# Patient Record
Sex: FEMALE | Race: ASIAN
[De-identification: names, ages, dates, MRNs, and addresses within clinical notes are randomized per-mention and may not be internally consistent; named-entity substitution may affect disease eponyms.]

---

## 2018-03-19 ENCOUNTER — HOSPITAL ENCOUNTER (OUTPATIENT)
Dept: HOSPITAL 7 - FB.SDS | Age: 53
Discharge: HOME | End: 2018-03-19
Attending: SURGERY
Payer: COMMERCIAL

## 2018-03-19 DIAGNOSIS — J30.2: ICD-10-CM

## 2018-03-19 DIAGNOSIS — F41.8: ICD-10-CM

## 2018-03-19 DIAGNOSIS — Z79.899: ICD-10-CM

## 2018-03-19 DIAGNOSIS — Z12.11: Primary | ICD-10-CM

## 2018-03-19 DIAGNOSIS — K57.30: ICD-10-CM

## 2018-03-19 PROCEDURE — 81025 URINE PREGNANCY TEST: CPT

## 2018-03-19 PROCEDURE — 45378 DIAGNOSTIC COLONOSCOPY: CPT

## 2018-03-19 NOTE — PCM.OPNOTE
- General Post-Op/Procedure Note


Date of Surgery/Procedure: 03/19/18


Operative Procedure(s): c scope


Findings: 





sigmoid diverticulosis





Pre Op Diagnosis: screening


Post-Op Diagnosis: sigmoid diverticulosis


Anesthesia Technique: MAC


Primary Surgeon: Jameel Morales


Anesthesia Provider: Layton Barcenas


Pathology: 





none





Complications: None


Condition: Good


Free Text/Narrative:: 





see dictation

## 2018-03-19 NOTE — PREOP
ADMISSION DATE:  2018

 

CHIEF COMPLAINT:  Need for colon cancer screening.

 

HISTORY OF PRESENT ILLNESS:  This is a 52-year-old  female who is referred

for screening C scope.  She is without complaints except for some occasional

abdominal pain.  She denies any change in bowel habits bleeding or diarrhea, and

negative family history as well.  This will be her 1st colonoscopy.

 

MEDICATIONS:  Omega-3 fish oil 1000 mg per mouth one time per day.

 

ALLERGIES:  She has seasonal allergies, which resulted in congestion.

 

PAST MEDICAL HISTORY:  Significant for anxiety and depression.

 

PAST SURGICAL HISTORY:  Significant for  and tubal ligation.

 

FAMILY HISTORY:  Significant for diabetes, hyperlipidemia, hypertension,

glaucoma.

 

SOCIAL HISTORY:  The patient is , has 3 children.  Does not smoke.  Does

not drink.

 

REVIEW OF SYSTEMS:  CONSTITUTIONAL :  Negative.  HEENT:  Negative.  RESPIRATORY

:  Negative.  CARDIOVASCULAR, GASTROINTESTINAL, GENITOURINARY, MUSCULOSKELETAL,

SKIN:  Negative.

 

PHYSICAL EXAMINATION:  GENERAL:  This is a well-developed, well-nourished

female, appearing in no acute distress.  HEENT:  Grossly within normal limits.

LUNGS :  Clear to auscultation.  CARDIAC:  Heart had a regular rate and rhythm.

GI:  Abdomen was soft, nontender.

 

ASSESSMENT:  Screening for colon cancer.

 

PLAN:  Colonoscopy.  Procedure and risks explained to the patient to include

bleeding, infection, perforation.  The patient expresses understanding and she

asked us to proceed.

 

Job#: [id number]

DD: [id number]

DT: [id number] AS/MODL

## 2018-03-19 NOTE — OR
DATE OF OPERATION:  03/19/2018

 

SURGEON:  Jameel Morales MD

 

PROCEDURE PERFORMED:  Colonoscopy.

 

PREOPERATIVE DIAGNOSIS:  Need for screening colonoscopy.

 

POSTOPERATIVE DIAGNOSIS:  Sigmoid diverticulosis.

 

INDICATIONS FOR PROCEDURE:  This is a 52-year-old  female, who presents for

a screening colonoscopy.  She is without complaints.

 

DESCRIPTION OF PROCEDURE:  After an excellent IV sedation was administered,

digital rectal exam was performed.  No marked abnormality was noted.  Flexible

colonoscope was inserted and advanced to the cecum without difficulty.  The prep

was excellent.  The following findings were noted.  Ascending colon,

unremarkable.  Transverse colon, unremarkable.  Descending colon, unremarkable.

Sigmoid, mild diverticulosis.  Rectum and anus, unremarkable.  Colon was

deflated.  Scope was removed.  The patient tolerated the procedure well, and was

taken to recovery room in good condition.

 

Job#: [id number]

DD: 03/19/2018 0826

DT: 03/19/2018 0907 AS/LANDRYL

## 2025-01-06 ENCOUNTER — HOSPITAL ENCOUNTER (EMERGENCY)
Dept: HOSPITAL 7 - FB.ED | Age: 60
Discharge: HOME | End: 2025-01-06
Payer: COMMERCIAL

## 2025-01-06 DIAGNOSIS — Z79.899: ICD-10-CM

## 2025-01-06 DIAGNOSIS — R07.89: Primary | ICD-10-CM

## 2025-01-06 LAB
ALBUMIN SERPL-MCNC: 3.7 G/DL (ref 3.5–5.2)
ALBUMIN/GLOB SERPL: 1.1 {RATIO}
ALP SERPL-CCNC: 72 IU/L (ref 56–112)
ALT SERPL-CCNC: 25 U/L (ref 12–36)
AST SERPL-CCNC: 16 IU/L (ref 5–25)
BASOPHILS # BLD AUTO: 0 X10-3/UL (ref 0–0.1)
BASOPHILS NFR BLD AUTO: 0.5 % (ref 0.2–1.5)
BILIRUB SERPL-MCNC: 0.4 MG/DL (ref 0.1–1.3)
BUN SERPL-MCNC: 19 MG/DL (ref 7–18)
BUN/CREAT SERPL: 21.1 (ref 9–20)
CALCIUM SERPL-MCNC: 8.9 MG/DL (ref 8.6–10.2)
CHLORIDE SERPL-SCNC: 105 MMOL/L (ref 100–110)
CO2 SERPL-SCNC: 29 MMOL/L (ref 21–32)
CREAT CL 24H UR+SERPL-VRATE: (no result) ML/MIN
CREAT SERPL-MCNC: 0.9 MG/DL (ref 0.55–1.02)
EOSINOPHIL # BLD AUTO: 0.3 X10-3/UL (ref 0–0.8)
EOSINOPHIL NFR BLD AUTO: 4.2 % (ref 0.6–8.1)
ERYTHROCYTE [DISTWIDTH] IN BLOOD BY AUTOMATED COUNT: 14.8 % (ref 12.3–16.5)
GLOBULIN SER-MCNC: 3.5 G/DL
GLUCOSE SERPL-MCNC: 103 MG/DL (ref 80–116)
HCT VFR BLD AUTO: 38.8 % (ref 34.2–48.2)
HGB BLD-MCNC: 13.8 G/DL (ref 11.4–15.5)
LYMPHOCYTES # BLD AUTO: 2.1 X10-3/UL (ref 1–4.4)
LYMPHOCYTES NFR BLD AUTO: 29 % (ref 18.4–52.1)
MCH RBC QN AUTO: 30.6 PG (ref 23.9–33.9)
MCHC RBC AUTO-ENTMCNC: 35.6 G/DL (ref 31.9–34.8)
MCHC RBC AUTO-ENTMCNC: 85.9 FL (ref 76.7–100.5)
MONOCYTES # BLD AUTO: 0.8 X10-3/UL (ref 0.3–1)
MONOCYTES NFR BLD AUTO: 11.3 % (ref 4.4–15.7)
NEUTROPHILS # BLD AUTO: 3.9 X10-3/UL (ref 1.5–6.3)
NEUTROPHILS NFR BLD AUTO: 55 % (ref 30.8–76.2)
PLATELET # BLD AUTO: 263 X10(3)UL (ref 151–488)
PMV BLD AUTO: 7.4 FL (ref 7.1–12.4)
POTASSIUM SERPL-SCNC: 4.1 MMOL/L (ref 3.5–5.3)
PROT SERPL-MCNC: 7.2 G/DL (ref 6–8)
RBC # BLD AUTO: 4.52 X10(6)UL (ref 3.6–5.2)
SODIUM SERPL-SCNC: 144 MMOL/L (ref 135–145)
WBC # BLD AUTO: 7.1 X10-3/UL (ref 3–10.3)

## 2025-01-06 PROCEDURE — 80053 COMPREHEN METABOLIC PANEL: CPT

## 2025-01-06 PROCEDURE — 84484 ASSAY OF TROPONIN QUANT: CPT

## 2025-01-06 PROCEDURE — 71045 X-RAY EXAM CHEST 1 VIEW: CPT

## 2025-01-06 PROCEDURE — 99285 EMERGENCY DEPT VISIT HI MDM: CPT

## 2025-01-06 PROCEDURE — 85025 COMPLETE CBC W/AUTO DIFF WBC: CPT

## 2025-01-06 PROCEDURE — 36415 COLL VENOUS BLD VENIPUNCTURE: CPT

## 2025-01-06 PROCEDURE — 93005 ELECTROCARDIOGRAM TRACING: CPT
